# Patient Record
Sex: MALE | Race: OTHER | NOT HISPANIC OR LATINO | URBAN - METROPOLITAN AREA
[De-identification: names, ages, dates, MRNs, and addresses within clinical notes are randomized per-mention and may not be internally consistent; named-entity substitution may affect disease eponyms.]

---
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
## 2022-03-03 NOTE — ED ADULT NURSE NOTE - PRIMARY CARE PROVIDER
"[facility]   Physical Therapy Treatment Note     [facility]  [facility] Number[facility] [id number]    Therapy Diagnosis:   Encounter Diagnoses   Name Primary?    Chronic bilateral low back pain with bilateral sciatica Yes    Weakness of both lower extremities     Difficulty walking     Radiculopathy of lumbosacral region      Physician: Jose L Brown MD    Visit Date: 3/3/2022      Physician Orders: PT Eval and Treat   Medical Diagnosis from Referral: Sacroiliac joint dysfunction of both sides [M53.3]  Evaluation Date: 12/14/2021  Authorization Period Expiration: 12/13/2022  Plan of Care Expiration: 05/04/2022  Progress Note Due:  03/10/2022  Visit # / Visits authorized: 10/ 20   FOTO: 1/ 3      Precautions: Standard and Diabetes, hx of lumbar spine laminectomy (2019), lumbar spine fusion (04/2021)    Time In: 10:45 am  Time Out: 11:30 am  Total Appointment Time (timed & untimed codes): 45 minutes      SUBJECTIVE   Pt reports: My surgery is scheduled for March 23. He is going to fuse my SI jt. The screws in my L5/S1 never fused so he has to go nack in and fix that       He was compliant with home exercise program.  Response to previous treatment: soreness  Functional change: none to note    Pain: 6/10  Location: bilateral back      OBJECTIVE   Last reassessed: 02/10/2022              Treatment     Kirill received the treatments listed below:      therapeutic exercises to develop strength, endurance, ROM, flexibility, posture and core stabilization for 40 minutes including:    Progress Note Measurements  Nustep     x 6 min  Seated Sciatic N glide  2 x 10  +Seated QL stretch   2 x 10   Supine march   3 x 10/ea  LTR w/ SB    2 x 10  Piriformis Stretch    3x30"/ea  HSS     3 x 30"  Bridges     2 x 15    +Pudendal N glide Seated on High Low 1 x 10  Seated Pelvic Tilt   2 x 10  Seated March   2 x 10  Prone hip Extension  2 x 12  +Windshield wipers   2 x 10  Seated LAQ 2#    2 x " 10  Seated HS curl GTB   2 x 10  Seated row with RTB  2 x 10  Seated no money with RTB 2 x 10  Seated paloff press with RTB 2 x 10 ea  seated flexion roll out with PB 10x  Glute sets                                          15 x ea  Left hip flexion Iso                            2 x 10  Posterior pelvic tilt                           15 x ea    SLR     2 x 12  Hip add ball squeeze   2x15 x ea  Hip fall out    15 x ea   Clamshells 2 x 10  Seated T/S rotation   2 x 15    Kirill received the following manual therapy techniques:  were applied for 20 minutes, including:  Cupping lumbar spine Paraspinals & scar  STM lumbar spine Paraspinals  W/ biofreeze  Gr II-III CPA T8-T12  MFR B piriformis  Left posterior innominate correction    Kirill participated in gait training to improve functional mobility and safety for 00  minutes, includin laps in //  4 steps x 8 w/o UE support    cold pack for 00 minutes to low back.        Patient Education and Home Exercises     Home Exercises Provided and Patient Education Provided     Education provided:   - form  -Add piriformis stretch to HEP    Written Home Exercises Provided: Patient instructed to cont prior HEP. Exercises were reviewed and Kirill was able to demonstrate them prior to the end of the session.  Kirill demonstrated good  understanding of the education provided. See EMR under Patient Instructions for exercises provided during therapy sessions    ASSESSMENT   Kirill tolerated the above tx session well with minimal c/o pain.There were no adverse effects with any of the prescribed exercises, and pt responded well to piriformis release on both sides. Continue to progress based on pt tolerance.     Kirill Is progressing well towards his goals.   Pt prognosis is Good.     Pt will continue to benefit from skilled outpatient physical therapy to address the deficits listed in the problem list box on initial evaluation, provide pt/family education and to maximize pt's level of  independence in the home and community environment.     Pt's spiritual, cultural and educational needs considered and pt agreeable to plan of care and goals.     Anticipated barriers to physical therapy: chronicity of symptoms, multiple body parts       Goals:   Short Term Goals: 4 weeks   1. The patient with report compliance with HEP to maximize functional outcomes. Met  2. The patient will demonstrate increase in BLE MMT by 1/2 grade to improve pt's functional mobility Met  3. The patient will decrease pain level by 50% in order to improve QOL. ongoing     Long Term Goals: 12 weeks   1. The patient will demonstrate understanding and performance of advanced HEP to allow for independence once discharged from therapy. Met  2. 2. The patient will demonstrate increase in BLE MMT by 1 grade to improve pt's functional mobility Ongoing  3. The patient will report 64 % functional limitation on FOTO to indicate an improvement in overall function.  4. The patient will be able to perform supine<>sit transfer w/o pain in order to improve pt's ability to sleep.  5. Pt will perform 10 squats w/ HHA w/o pain in order to improve pt's functional mobility.  6. Pt will be able to walk 5' w/o AD in order to improve pt's community ambulation      PLAN     Plan of care Certification: 12/14/2021 to 05/04/2022.     Outpatient Physical Therapy 2 times weekly for 6 weeks to include the following interventions: Cervical/Lumbar Traction, Electrical Stimulation Dry Needling/cupping, Gait Training, Manual Therapy, Moist Heat/ Ice, Neuromuscular Re-ed, Patient Education, Self Care, Therapeutic Activites and Therapeutic Exercise.       Allison Peters, PT   2/23/2022     UNK

## 2022-04-18 ENCOUNTER — APPOINTMENT (OUTPATIENT)
Dept: GASTROENTEROLOGY | Facility: CLINIC | Age: 22
End: 2022-04-18
Payer: MEDICAID

## 2022-04-18 PROCEDURE — 99214 OFFICE O/P EST MOD 30 MIN: CPT

## 2022-04-21 ENCOUNTER — LABORATORY RESULT (OUTPATIENT)
Age: 22
End: 2022-04-21

## 2022-04-25 VITALS — HEIGHT: 74 IN | WEIGHT: 315 LBS | BODY MASS INDEX: 40.43 KG/M2

## 2022-04-25 NOTE — CONSULT LETTER
[Dear  ___] : Dear  [unfilled], [Consult Letter:] : I had the pleasure of evaluating your patient, [unfilled]. [( Thank you for referring [unfilled] for consultation for _____ )] : Thank you for referring [unfilled] for consultation for [unfilled] [Please see my note below.] : Please see my note below. [Consult Closing:] : Thank you very much for allowing me to participate in the care of this patient.  If you have any questions, please do not hesitate to contact me. [Sincerely,] : Sincerely, [FreeTextEntry3] : Deven Norman MD\par \par Gastroenterology\par [facility]\par [facility]\par \par

## 2022-04-25 NOTE — ASSESSMENT
[FreeTextEntry1] : DOUGLAS SEAMAN was advised to undergo endoscopy to which he agreed. The procedure will be performed in [facility] with the assistance of an anesthesiologist. He was given a booklet distributed by the [employer] explaining the procedure in detail and he understood the risks of the procedure not limited to infection, bleeding, perforation or non- diagnosis of gastric or esophageal cancer.  He was advised that he could not drive home, if he chooses to receive sedation. Further diagnostic and treatment recommendations will be based upon the procedure and any biopsies, if they are taken. Thank you for allowing me to participate in this [facility] health care.\par \par \par Weight Loss

## 2022-04-25 NOTE — HISTORY OF PRESENT ILLNESS
[FreeTextEntry1] : He is 21-year-old male with recurrence of his heartburn.  He denies  difficulty swallowing food or NSAID use.  He had been on pantoprazole and famotidine  but stopped these as  he was feeling better.  He denies abdominal pain

## 2022-04-26 ENCOUNTER — APPOINTMENT (OUTPATIENT)
Dept: GASTROENTEROLOGY | Facility: AMBULATORY SURGERY CENTER | Age: 22
End: 2022-04-26
Payer: MEDICAID

## 2022-04-26 ENCOUNTER — RESULT REVIEW (OUTPATIENT)
Age: 22
End: 2022-04-26

## 2022-04-26 PROCEDURE — 43239 EGD BIOPSY SINGLE/MULTIPLE: CPT

## 2022-04-26 RX ORDER — PANTOPRAZOLE 40 MG/1
40 TABLET, DELAYED RELEASE ORAL
Qty: 30 | Refills: 3 | Status: ACTIVE | COMMUNITY
Start: 2022-04-26 | End: 1900-01-01

## 2022-04-26 RX ORDER — FAMOTIDINE 40 MG/1
40 TABLET, FILM COATED ORAL
Qty: 30 | Refills: 3 | Status: ACTIVE | COMMUNITY
Start: 2022-04-26 | End: 1900-01-01

## 2022-10-13 ENCOUNTER — APPOINTMENT (OUTPATIENT)
Dept: GASTROENTEROLOGY | Facility: CLINIC | Age: 22
End: 2022-10-13

## 2022-10-24 ENCOUNTER — APPOINTMENT (OUTPATIENT)
Dept: GASTROENTEROLOGY | Facility: CLINIC | Age: 22
End: 2022-10-24

## 2022-10-24 VITALS
WEIGHT: 315 LBS | DIASTOLIC BLOOD PRESSURE: 90 MMHG | HEART RATE: 80 BPM | TEMPERATURE: 97.3 F | SYSTOLIC BLOOD PRESSURE: 136 MMHG | HEIGHT: 74 IN | RESPIRATION RATE: 16 BRPM | OXYGEN SATURATION: 99 % | BODY MASS INDEX: 40.43 KG/M2

## 2022-10-24 DIAGNOSIS — R11.0 NAUSEA: ICD-10-CM

## 2022-10-24 DIAGNOSIS — R13.19 OTHER DYSPHAGIA: ICD-10-CM

## 2022-10-24 DIAGNOSIS — R07.89 OTHER CHEST PAIN: ICD-10-CM

## 2022-10-24 DIAGNOSIS — K21.9 GASTRO-ESOPHAGEAL REFLUX DISEASE W/OUT ESOPHAGITIS: ICD-10-CM

## 2022-10-24 DIAGNOSIS — R12 HEARTBURN: ICD-10-CM

## 2022-10-24 PROCEDURE — 99214 OFFICE O/P EST MOD 30 MIN: CPT

## 2022-10-24 RX ORDER — RABEPRAZOLE SODIUM 20 MG/1
20 TABLET, DELAYED RELEASE ORAL
Qty: 30 | Refills: 5 | Status: ACTIVE | COMMUNITY
Start: 2022-10-24 | End: 1900-01-01

## 2022-10-24 NOTE — CONSULT LETTER
[Dear  ___] : Dear  [unfilled], [Consult Letter:] : I had the pleasure of evaluating your patient, [unfilled]. [( Thank you for referring [unfilled] for consultation for _____ )] : Thank you for referring [unfilled] for consultation for [unfilled] [Please see my note below.] : Please see my note below. [Consult Closing:] : Thank you very much for allowing me to participate in the care of this patient.  If you have any questions, please do not hesitate to contact me. [Sincerely,] : Sincerely, [FreeTextEntry3] : Deven Norman MD\par \par Gastroenterology\par [facility]\par [facility]\par \par

## 2022-10-24 NOTE — ASSESSMENT
[FreeTextEntry1] : Eat slowly and chew the food very carefully\par Start rabeprazole 20 mg daily 1 hour before breakfast\par LOSE WEIGHT\par \par Office  f/u in 1 month

## 2022-10-24 NOTE — HISTORY OF PRESENT ILLNESS
[FreeTextEntry1] : Endoscopy  was done in April which revealed  a small gastric  erosion and a small hiatal hernia. Routine biopsies were negative for Helicobacter pylori. He was placed on pantoprazole 40 mg daily in the morning . he took this for a while  and then stopped as it was not helping him. He denies abdominal pain or NSAID use.

## 2022-10-24 NOTE — REVIEW OF SYSTEMS
[Chest Pain] : chest pain [Heartburn] : heartburn [Negative] : Heme/Lymph [FreeTextEntry7] : dysphagia nausea

## 2022-11-15 NOTE — ED PROVIDER NOTE - OBJECTIVE STATEMENT
Sheath #1: Sheath: removed. Hemostasis achieved. 17 YO M presents to ED c/o pain along the lateral side of his rt ankle. Pt had an inversion injury today of his rt ankle during football practice.

## 2022-11-29 ENCOUNTER — APPOINTMENT (OUTPATIENT)
Dept: GASTROENTEROLOGY | Facility: CLINIC | Age: 22
End: 2022-11-29

## 2023-06-05 NOTE — ED ADULT NURSE NOTE - NS ED NURSE LEVEL OF CONSCIOUSNESS MENTAL STATUS
Awake/Alert/Cooperative Same Histology In Subsequent Stages Text: The pattern and morphology of the tumor is as described in the first stage.

## 2023-11-09 NOTE — ED PEDIATRIC NURSE NOTE - DISCHARGE TEACHING
What Type Of Note Output Would You Prefer (Optional)?: Standard Output d/c done regarding ankle sprain, s/s to return, follow up with PMD. Pt. and mom comfortable with d/c plan and summary Hpi Title: Evaluation of Skin Lesions How Severe Are Your Spot(S)?: moderate Have Your Spot(S) Been Treated In The Past?: has not been treated

## 2024-09-09 NOTE — ED PROVIDER NOTE - NSFOLLOWUPINSTRUCTIONS_ED_ALL_ED_FT
actual
1) Please follow-up with your primary care doctor.  If you cannot follow-up with your doctor(s), please return to the ED for any urgent issues.  2) If you have any worsening of symptoms, including chest pain or difficulty breathing, or any other concerns please return to the ED immediately.  3) Please continue taking your home medications as directed.  4) You may have been given a copy of your labs and/or imaging.  Please go over these with your primary care doctor.

## 2025-01-21 NOTE — ED ADULT NURSE NOTE - DOES PATIENT HAVE ADVANCE DIRECTIVE
"    H&P Interval Update      H&P performed by Brady Reilly PA-C on 1/6/25 was reviewed.     After examining the patient I find no changes in the patients condition since the H&P had been written.     /76   Pulse 58   Temp (!) 97.3 °F (36.3 °C) (Temporal)   Resp 18   Ht 5' 8\" (1.727 m)   Wt 107 kg (235 lb 3.7 oz)   SpO2 98%   BMI 35.77 kg/m²             General:  alert, awake, no acute distress   Head: atraumatic  Heart:  mildly bradycardic, regular rhythm  Lungs: Clear to auscultation bilaterally  Abdomen:  obese, soft, non distended, no tenderness to palpation  Extremities:  no erythema, no edema    Of note Hgb A1c still above 8  " No